# Patient Record
Sex: FEMALE | Race: WHITE
[De-identification: names, ages, dates, MRNs, and addresses within clinical notes are randomized per-mention and may not be internally consistent; named-entity substitution may affect disease eponyms.]

---

## 2021-12-26 ENCOUNTER — HOSPITAL ENCOUNTER (EMERGENCY)
Dept: HOSPITAL 49 - FER | Age: 81
Discharge: HOME | End: 2021-12-26
Payer: COMMERCIAL

## 2021-12-26 DIAGNOSIS — R10.9: ICD-10-CM

## 2021-12-26 DIAGNOSIS — L50.9: ICD-10-CM

## 2021-12-26 DIAGNOSIS — K62.5: Primary | ICD-10-CM

## 2021-12-26 DIAGNOSIS — Z88.6: ICD-10-CM

## 2021-12-26 DIAGNOSIS — Z88.5: ICD-10-CM

## 2021-12-26 DIAGNOSIS — Z88.2: ICD-10-CM

## 2021-12-26 LAB
ALBUMIN SERPL-MCNC: 3.3 G/DL (ref 3.4–5)
ALKALINE PHOSHATASE: 111 U/L (ref 46–116)
ALT SERPL-CCNC: 18 U/L (ref 14–59)
AST: 17 U/L (ref 15–37)
BASOPHIL: 0.3 % (ref 0–2)
BILIRUBIN - TOTAL: 1.1 MG/DL (ref 0.2–1)
BUN SERPL-MCNC: 15 MG/DL (ref 7–18)
BUN/CREAT RATIO (CALC): 20.8 RATIO
CHLORIDE: 103 MMOL/L (ref 98–107)
CO2 (BICARBONATE): 23 MMOL/L (ref 21–32)
CREATININE: 0.72 MG/DL (ref 0.51–0.95)
EOSINOPHIL: 1 % (ref 0–7)
GLOBULIN (CALCULATION): 3.5 G/DL
GLUCOSE SERPL-MCNC: 104 MG/DL (ref 74–106)
HCT: 44 % (ref 37–47)
HGB BLD-MCNC: 14.6 G/DL (ref 12.5–16)
INR PPP: 1.11 (ref 0.9–1.2)
LACTIC ACID: 0.9 MMOL/L (ref 0.4–1.9)
LYMPHOCYTE: 9.2 % (ref 15–48)
MCH RBC QN AUTO: 29.9 PG (ref 25–31)
MCHC RBC AUTO-ENTMCNC: 33.2 G/DL (ref 32–36)
MCV: 90.2 FL (ref 78–100)
MONOCYTE: 8.9 % (ref 0–12)
MPV: 9.1 FL (ref 6–9.5)
NEUTROPHIL: 80 % (ref 41–80)
NRBC: 0
PLT: 210 K/UL (ref 150–400)
POTASSIUM: 3.8 MMOL/L (ref 3.5–5.1)
PROTHROMBIN TIME: 13.7 SECONDS (ref 11.8–13.4)
PTT: 26.6 SECONDS (ref 24.4–34.7)
RBC MORPHOLOGY: NORMAL
RBC: 4.88 M/UL (ref 4.2–5.4)
RDW: 13.3 % (ref 11.5–14)
TOTAL PROTEIN: 6.8 G/DL (ref 6.4–8.2)
WBC: 9 K/UL (ref 4–10.5)

## 2021-12-28 ENCOUNTER — HOSPITAL ENCOUNTER (EMERGENCY)
Dept: HOSPITAL 49 - FER | Age: 81
Discharge: HOME | End: 2021-12-28
Payer: COMMERCIAL

## 2021-12-28 DIAGNOSIS — T78.3XXA: Primary | ICD-10-CM

## 2022-01-31 ENCOUNTER — HOSPITAL ENCOUNTER (EMERGENCY)
Dept: HOSPITAL 49 - FER | Age: 82
Discharge: HOME | End: 2022-01-31
Payer: COMMERCIAL

## 2022-01-31 DIAGNOSIS — Z88.2: ICD-10-CM

## 2022-01-31 DIAGNOSIS — I10: ICD-10-CM

## 2022-01-31 DIAGNOSIS — Z88.6: ICD-10-CM

## 2022-01-31 DIAGNOSIS — Z88.8: ICD-10-CM

## 2022-01-31 DIAGNOSIS — Z88.0: ICD-10-CM

## 2022-01-31 DIAGNOSIS — B37.2: Primary | ICD-10-CM

## 2022-01-31 DIAGNOSIS — Z88.5: ICD-10-CM

## 2022-05-10 ENCOUNTER — HOSPITAL ENCOUNTER
Dept: HOSPITAL 49 - FER | Age: 82
LOS: 4 days | Discharge: HOME HEALTH | End: 2022-05-14
Attending: INTERNAL MEDICINE | Admitting: INTERNAL MEDICINE
Payer: COMMERCIAL

## 2022-05-10 VITALS — HEIGHT: 60.98 IN | BODY MASS INDEX: 41.19 KG/M2 | WEIGHT: 218.19 LBS

## 2022-05-10 DIAGNOSIS — I25.10: ICD-10-CM

## 2022-05-10 DIAGNOSIS — M54.9: ICD-10-CM

## 2022-05-10 DIAGNOSIS — E66.9: ICD-10-CM

## 2022-05-10 DIAGNOSIS — G89.29: ICD-10-CM

## 2022-05-10 DIAGNOSIS — N39.0: ICD-10-CM

## 2022-05-10 DIAGNOSIS — R07.89: ICD-10-CM

## 2022-05-10 DIAGNOSIS — J18.8: Primary | ICD-10-CM

## 2022-05-10 DIAGNOSIS — Z88.2: ICD-10-CM

## 2022-05-10 DIAGNOSIS — Z88.6: ICD-10-CM

## 2022-05-10 DIAGNOSIS — Z88.5: ICD-10-CM

## 2022-05-10 DIAGNOSIS — M19.90: ICD-10-CM

## 2022-05-10 DIAGNOSIS — R91.1: ICD-10-CM

## 2022-05-10 DIAGNOSIS — I10: ICD-10-CM

## 2022-05-10 DIAGNOSIS — Z20.822: ICD-10-CM

## 2022-05-10 DIAGNOSIS — Z85.828: ICD-10-CM

## 2022-05-10 DIAGNOSIS — I25.2: ICD-10-CM

## 2022-05-10 DIAGNOSIS — R10.9: ICD-10-CM

## 2022-05-10 DIAGNOSIS — K76.9: ICD-10-CM

## 2022-05-10 DIAGNOSIS — Z88.8: ICD-10-CM

## 2022-05-10 LAB
ALBUMIN SERPL-MCNC: 2.9 G/DL (ref 3.4–5)
ALKALINE PHOSHATASE: 207 U/L (ref 46–116)
ALT SERPL-CCNC: 47 U/L (ref 14–59)
AST: 31 U/L (ref 15–37)
BACTERIA: (no result)
BASOPHIL: 0.2 % (ref 0–2)
BILIRUBIN - TOTAL: 1.9 MG/DL (ref 0.2–1)
BILIRUBIN: (no result) MG/DL
BLOOD: (no result) ERY/UL
BUN SERPL-MCNC: 17 MG/DL (ref 7–18)
BUN/CREAT RATIO (CALC): 19.1 RATIO
CHLORIDE: 100 MMOL/L (ref 98–107)
CLARITY UR: CLEAR
CO2 (BICARBONATE): 24 MMOL/L (ref 21–32)
COLOR: YELLOW
CORONAVIRUS 2019 SARS-COV-2: NEGATIVE
CREATININE: 0.89 MG/DL (ref 0.51–0.95)
EOSINOPHIL: 0.3 % (ref 0–7)
GLOBULIN (CALCULATION): 3.3 G/DL
GLUCOSE (U): NORMAL MG/DL
GLUCOSE SERPL-MCNC: 108 MG/DL (ref 74–106)
HCT: 45 % (ref 37–47)
HGB BLD-MCNC: 14.7 G/DL (ref 12.5–16)
INFLUENZA A NAA: NEGATIVE
INR PPP: 1.11 (ref 0.9–1.2)
IRON % SATURATION: 7.4 %SAT (ref 20–50)
IRON SERPL-MCNC: 18 UG/DL (ref 50–170)
LACTIC ACID: 1 MMOL/L (ref 0.4–1.9)
LEUKOCYTES: NEGATIVE LEU/UL
LYMPHOCYTE: 4.6 % (ref 15–48)
MAGNESIUM SERPL-MCNC: 2.1 MG/DL (ref 1.8–2.4)
MCH RBC QN AUTO: 29.5 PG (ref 25–31)
MCHC RBC AUTO-ENTMCNC: 32.7 G/DL (ref 32–36)
MCV: 90.2 FL (ref 78–100)
MONOCYTE: 11.8 % (ref 0–12)
MPV: 9.1 FL (ref 6–9.5)
NEUTROPHIL: 82.1 % (ref 41–80)
NITRITE: NEGATIVE MG/DL
NRBC: 0
PLT: 146 K/UL (ref 150–400)
POTASSIUM: 4.3 MMOL/L (ref 3.5–5.1)
PROTEIN: NEGATIVE MG/DL
PROTHROMBIN TIME: 13.7 SECONDS (ref 11.8–13.4)
PTT: 29.1 SECONDS (ref 24.4–34.7)
RBC MORPHOLOGY: NORMAL
RBC: 4.99 M/UL (ref 4.2–5.4)
RDW: 13.6 % (ref 11.5–14)
SPECIFIC GRAVITY: 1.02 (ref 1–1.03)
SQUAMOUS EPITHELIAL CELLS: (no result)
TOTAL PROTEIN: 6.2 G/DL (ref 6.4–8.2)
URINARY RBC: (no result)
URINARY WBC: (no result)
UROBILINOGEN: 0.2 MG/DL (ref 0.2–1)
WBC: 17.8 K/UL (ref 4–10.5)

## 2022-05-10 PROCEDURE — G0378 HOSPITAL OBSERVATION PER HR: HCPCS

## 2022-05-10 PROCEDURE — A9579 GAD-BASE MR CONTRAST NOS,1ML: HCPCS

## 2022-05-10 PROCEDURE — U0002 COVID-19 LAB TEST NON-CDC: HCPCS

## 2022-05-11 LAB
BASOPHIL: 0.3 % (ref 0–2)
BUN SERPL-MCNC: 14 MG/DL (ref 7–18)
BUN/CREAT RATIO (CALC): 14.4 RATIO
CHLORIDE: 101 MMOL/L (ref 98–107)
CO2 (BICARBONATE): 27 MMOL/L (ref 21–32)
CREATININE: 0.97 MG/DL (ref 0.51–0.95)
EOSINOPHIL: 0.4 % (ref 0–7)
GLUCOSE SERPL-MCNC: 122 MG/DL (ref 74–106)
HCT: 41.8 % (ref 37–47)
HGB BLD-MCNC: 13.1 G/DL (ref 12.5–16)
LYMPHOCYTE: 6 % (ref 15–48)
MCH RBC QN AUTO: 29.1 PG (ref 25–31)
MCHC RBC AUTO-ENTMCNC: 31.3 G/DL (ref 32–36)
MCV: 92.9 FL (ref 78–100)
MONOCYTE: 15 % (ref 0–12)
MPV: 9.5 FL (ref 6–9.5)
NEUTROPHIL: 77.6 % (ref 41–80)
NRBC: 0
PLT: 140 K/UL (ref 150–400)
POTASSIUM: 4.1 MMOL/L (ref 3.5–5.1)
RBC MORPHOLOGY: NORMAL
RBC: 4.5 M/UL (ref 4.2–5.4)
RDW: 13.7 % (ref 11.5–14)
WBC: 16 K/UL (ref 4–10.5)

## 2022-05-12 NOTE — NUR
5/12/22 Ms. Johnson Barlow lives at [facility]. She has a rw and cane. The
bathroom is handicapped accessible. Will monitor for 02 needs.

## 2022-05-12 NOTE — NUR
PT STATED THAT SHE THOUGHT THERE WERE MORE MEDICATIONS THAT SHE WAS SUPPOSED
TO BE TAKING. CALLED [facility] TO GET A LIST, UPDATED MED LIST
ACCORDING TO PHARMACIST AT [facility]. MD MALIK MADE AWARE

## 2022-05-13 LAB
ALBUMIN SERPL-MCNC: 2.1 G/DL (ref 3.4–5)
ALKALINE PHOSHATASE: 200 U/L (ref 46–116)
ALT SERPL-CCNC: 30 U/L (ref 14–59)
AST: 20 U/L (ref 15–37)
BASOPHIL: 0.5 % (ref 0–2)
BILIRUBIN - TOTAL: 0.7 MG/DL (ref 0.2–1)
BUN SERPL-MCNC: 23 MG/DL (ref 7–18)
BUN/CREAT RATIO (CALC): 19.3 RATIO
CHLORIDE: 101 MMOL/L (ref 98–107)
CO2 (BICARBONATE): 24 MMOL/L (ref 21–32)
CREATININE: 1.19 MG/DL (ref 0.51–0.95)
EOSINOPHIL: 3.9 % (ref 0–7)
GLOBULIN (CALCULATION): 3.7 G/DL
GLUCOSE SERPL-MCNC: 137 MG/DL (ref 74–106)
HCT: 38.5 % (ref 37–47)
HGB BLD-MCNC: 12.2 G/DL (ref 12.5–16)
LIPASE: 29 U/L (ref 73–393)
LYMPHOCYTE: 8.4 % (ref 15–48)
MCH RBC QN AUTO: 29.9 PG (ref 25–31)
MCHC RBC AUTO-ENTMCNC: 31.7 G/DL (ref 32–36)
MCV: 94.4 FL (ref 78–100)
MONOCYTE: 11.4 % (ref 0–12)
MPV: 9.8 FL (ref 6–9.5)
NEUTROPHIL: 75 % (ref 41–80)
NRBC: 0
PLT: 125 K/UL (ref 150–400)
POTASSIUM: 4.5 MMOL/L (ref 3.5–5.1)
RBC MORPHOLOGY: NORMAL
RBC: 4.08 M/UL (ref 4.2–5.4)
RDW: 13.8 % (ref 11.5–14)
TOTAL PROTEIN: 5.8 G/DL (ref 6.4–8.2)
WBC: 8 K/UL (ref 4–10.5)

## 2022-05-14 NOTE — NUR
CINDY AT [facility] CALLED BACK TO THE FLOOR SPOKE TO SAVANNAH MARTINEZ AND UPDATED THAT
THE PATIENT WOULD BE DECLINED HOME HEALTH, THAT THERE WAS NOT STAFF AND THAT
THERE WAS NO RECORD OF THE REFERAL. HELENA AMBROCIO WAS UPDATED THAT THE PATIENT
WOULD NOT HAVE HOME HEALTH AND EVIN WOULD BE LEFT A MESSAGE.  SHUKRI REAL
REACHED OUT TO GEORGE TARIQ WITH [facility] AND GEORGE TARIQ CONFIRMED THAT THE PATIENT
WAS ON THE LIST TO BE SEEN AND THAT THERE WAS A PLAN IN PLACE FOR HOME HEALTH
TO SEE PATIENT.

## 2022-05-14 NOTE — NUR
CALLED [facility] FOR DISCHARGEW ACCORDING TO SAMIRA'S NOTE. WHEN CALLING [facility]
THEY STATED THAT THEY DID NOT HAVE A PATIENT LISTED BY THAT NAME OR BIRTHDAY
ASSOCIATED. SAVANNAH MARTINEZ SPOKE TO MD MALIK TO VERIFY THAT THERE WAS AN REFERAL
SENT TO [facility]. LEFT IN A NOTE BY EVIN VIA TRENA STATES THAT THEY WAS AS
REFERAL CALLED OVER AND NO ONE ANSWERED AND THEN AN EMAIL WAS SENT WITH THE
REFERAL. SUSIE CALLED BACK TO THE FLOOR TO VERIFY THERE WAS NOT A REFERAL
SAVANNAH MARTINEZ ASKED IF THERE WAS A EMAIL STATING SO, AND THE NURSE ON CALL STATED
SHE DID NOT HAVE ACCESS TO THE EMAILS.
HELENA NOTIFED. PT CONTINUED TO DC

## 2022-08-24 ENCOUNTER — HOSPITAL ENCOUNTER (EMERGENCY)
Dept: HOSPITAL 49 - FER | Age: 82
Discharge: HOME | End: 2022-08-24
Payer: COMMERCIAL

## 2022-08-24 DIAGNOSIS — Z88.5: ICD-10-CM

## 2022-08-24 DIAGNOSIS — D32.9: Primary | ICD-10-CM

## 2022-08-24 DIAGNOSIS — I10: ICD-10-CM

## 2022-08-24 DIAGNOSIS — R41.3: ICD-10-CM

## 2022-08-24 DIAGNOSIS — Z88.2: ICD-10-CM

## 2022-08-24 DIAGNOSIS — Z88.6: ICD-10-CM

## 2022-08-24 LAB
ALBUMIN SERPL-MCNC: 3.3 G/DL (ref 3.4–5)
ALKALINE PHOSHATASE: 123 U/L (ref 46–116)
ALT SERPL-CCNC: 28 U/L (ref 14–59)
AST: 23 U/L (ref 15–37)
BASOPHIL(M): 1 % (ref 0–2)
BASOPHIL: 1.4 % (ref 0–2)
BILIRUBIN - TOTAL: 0.7 MG/DL (ref 0.2–1)
BILIRUBIN: NEGATIVE MG/DL
BLOOD: (no result) ERY/UL
BUN SERPL-MCNC: 17 MG/DL (ref 7–18)
BUN/CREAT RATIO (CALC): 19.5 RATIO
CHLORIDE: 107 MMOL/L (ref 98–107)
CLARITY UR: (no result)
CO2 (BICARBONATE): 26 MMOL/L (ref 21–32)
COLOR: YELLOW
CREATININE: 0.87 MG/DL (ref 0.51–0.95)
EOSINOPHIL(M): 6 % (ref 0–7)
EOSINOPHIL: 4.2 % (ref 0–7)
GLOBULIN (CALCULATION): 2.9 G/DL
GLUCOSE (U): NORMAL MG/DL
GLUCOSE SERPL-MCNC: 103 MG/DL (ref 74–106)
HCT: 44.8 % (ref 37–47)
HGB BLD-MCNC: 14.7 G/DL (ref 12.5–16)
LEUKOCYTES: NEGATIVE LEU/UL
LYMPHOCYTE(M): 19 % (ref 15–48)
LYMPHOCYTE: 16.5 % (ref 15–48)
MCH RBC QN AUTO: 29.8 PG (ref 25–31)
MCHC RBC AUTO-ENTMCNC: 32.8 G/DL (ref 32–36)
MCV: 90.7 FL (ref 78–100)
MONOCYTE(M): 11 % (ref 0–12)
MONOCYTE: 9.9 % (ref 0–12)
MPV: 9.5 FL (ref 6–9.5)
NEUTROPHIL: 67.5 % (ref 41–80)
NEUTROPHILS(M): 63 % (ref 41–80)
NITRITE: NEGATIVE MG/DL
NRBC: 0
PLATELET ESTIMATE: NORMAL
PLATELET MORPHOLOGY: NORMAL
PLT: 189 K/UL (ref 150–400)
POTASSIUM: 4.1 MMOL/L (ref 3.5–5.1)
PROTEIN: NEGATIVE MG/DL
RBC MORPHOLOGY: NORMAL
RBC: 4.94 M/UL (ref 4.2–5.4)
RDW: 13.7 % (ref 11.5–14)
SPECIFIC GRAVITY: >=1.03 (ref 1–1.03)
TOTAL CELL COUNT: 100
TOTAL PROTEIN: 6.2 G/DL (ref 6.4–8.2)
UROBILINOGEN: 0.2 MG/DL (ref 0.2–1)
WBC: 6.4 K/UL (ref 4–10.5)